# Patient Record
Sex: FEMALE | Race: WHITE | HISPANIC OR LATINO | URBAN - METROPOLITAN AREA
[De-identification: names, ages, dates, MRNs, and addresses within clinical notes are randomized per-mention and may not be internally consistent; named-entity substitution may affect disease eponyms.]

---

## 2022-07-17 ENCOUNTER — HOSPITAL ENCOUNTER (INPATIENT)
Facility: MEDICAL CENTER | Age: 52
LOS: 1 days | DRG: 313 | End: 2022-07-18
Attending: EMERGENCY MEDICINE | Admitting: STUDENT IN AN ORGANIZED HEALTH CARE EDUCATION/TRAINING PROGRAM
Payer: MEDICAID

## 2022-07-17 ENCOUNTER — APPOINTMENT (OUTPATIENT)
Dept: RADIOLOGY | Facility: MEDICAL CENTER | Age: 52
DRG: 313 | End: 2022-07-17
Attending: EMERGENCY MEDICINE
Payer: MEDICAID

## 2022-07-17 DIAGNOSIS — Z79.01 ANTICOAGULATED: ICD-10-CM

## 2022-07-17 DIAGNOSIS — I10 PRIMARY HYPERTENSION: ICD-10-CM

## 2022-07-17 DIAGNOSIS — R07.9 ACUTE CHEST PAIN: ICD-10-CM

## 2022-07-17 PROBLEM — R79.1 SUPRATHERAPEUTIC INR: Status: ACTIVE | Noted: 2022-07-17

## 2022-07-17 PROBLEM — I48.0 PAROXYSMAL ATRIAL FIBRILLATION (HCC): Status: ACTIVE | Noted: 2022-07-17

## 2022-07-17 PROBLEM — R07.89 OTHER CHEST PAIN: Status: ACTIVE | Noted: 2022-07-17

## 2022-07-17 PROBLEM — E78.5 HYPERLIPIDEMIA: Status: ACTIVE | Noted: 2022-07-17

## 2022-07-17 PROBLEM — I24.9 ACS (ACUTE CORONARY SYNDROME) (HCC): Status: ACTIVE | Noted: 2022-07-17

## 2022-07-17 PROBLEM — E87.6 HYPOKALEMIA: Status: ACTIVE | Noted: 2022-07-17

## 2022-07-17 LAB
ALBUMIN SERPL BCP-MCNC: 4.6 G/DL (ref 3.2–4.9)
ALBUMIN/GLOB SERPL: 1.2 G/DL
ALP SERPL-CCNC: 69 U/L (ref 30–99)
ALT SERPL-CCNC: 25 U/L (ref 2–50)
ANION GAP SERPL CALC-SCNC: 12 MMOL/L (ref 7–16)
AST SERPL-CCNC: 20 U/L (ref 12–45)
BASOPHILS # BLD AUTO: 0.6 % (ref 0–1.8)
BASOPHILS # BLD: 0.06 K/UL (ref 0–0.12)
BILIRUB SERPL-MCNC: 0.4 MG/DL (ref 0.1–1.5)
BUN SERPL-MCNC: 25 MG/DL (ref 8–22)
CALCIUM SERPL-MCNC: 9.6 MG/DL (ref 8.5–10.5)
CHLORIDE SERPL-SCNC: 100 MMOL/L (ref 96–112)
CO2 SERPL-SCNC: 29 MMOL/L (ref 20–33)
CREAT SERPL-MCNC: 0.92 MG/DL (ref 0.5–1.4)
EKG IMPRESSION: NORMAL
EKG IMPRESSION: NORMAL
EOSINOPHIL # BLD AUTO: 0.07 K/UL (ref 0–0.51)
EOSINOPHIL NFR BLD: 0.7 % (ref 0–6.9)
ERYTHROCYTE [DISTWIDTH] IN BLOOD BY AUTOMATED COUNT: 46.2 FL (ref 35.9–50)
GFR SERPLBLD CREATININE-BSD FMLA CKD-EPI: 75 ML/MIN/1.73 M 2
GLOBULIN SER CALC-MCNC: 3.7 G/DL (ref 1.9–3.5)
GLUCOSE SERPL-MCNC: 167 MG/DL (ref 65–99)
HCT VFR BLD AUTO: 41 % (ref 37–47)
HGB BLD-MCNC: 12.9 G/DL (ref 12–16)
IMM GRANULOCYTES # BLD AUTO: 0.03 K/UL (ref 0–0.11)
IMM GRANULOCYTES NFR BLD AUTO: 0.3 % (ref 0–0.9)
INR PPP: 4.57 (ref 0.87–1.13)
LYMPHOCYTES # BLD AUTO: 2.54 K/UL (ref 1–4.8)
LYMPHOCYTES NFR BLD: 26.9 % (ref 22–41)
MAGNESIUM SERPL-MCNC: 2.3 MG/DL (ref 1.5–2.5)
MCH RBC QN AUTO: 24.7 PG (ref 27–33)
MCHC RBC AUTO-ENTMCNC: 31.5 G/DL (ref 33.6–35)
MCV RBC AUTO: 78.4 FL (ref 81.4–97.8)
MONOCYTES # BLD AUTO: 0.9 K/UL (ref 0–0.85)
MONOCYTES NFR BLD AUTO: 9.5 % (ref 0–13.4)
NEUTROPHILS # BLD AUTO: 5.85 K/UL (ref 2–7.15)
NEUTROPHILS NFR BLD: 62 % (ref 44–72)
NRBC # BLD AUTO: 0 K/UL
NRBC BLD-RTO: 0 /100 WBC
PLATELET # BLD AUTO: 381 K/UL (ref 164–446)
PMV BLD AUTO: 9.7 FL (ref 9–12.9)
POTASSIUM SERPL-SCNC: 3.3 MMOL/L (ref 3.6–5.5)
PROT SERPL-MCNC: 8.3 G/DL (ref 6–8.2)
PROTHROMBIN TIME: 41.4 SEC (ref 12–14.6)
RBC # BLD AUTO: 5.23 M/UL (ref 4.2–5.4)
SODIUM SERPL-SCNC: 141 MMOL/L (ref 135–145)
TROPONIN T SERPL-MCNC: 7 NG/L (ref 6–19)
WBC # BLD AUTO: 9.5 K/UL (ref 4.8–10.8)

## 2022-07-17 PROCEDURE — 85610 PROTHROMBIN TIME: CPT

## 2022-07-17 PROCEDURE — 71045 X-RAY EXAM CHEST 1 VIEW: CPT

## 2022-07-17 PROCEDURE — A9270 NON-COVERED ITEM OR SERVICE: HCPCS | Performed by: STUDENT IN AN ORGANIZED HEALTH CARE EDUCATION/TRAINING PROGRAM

## 2022-07-17 PROCEDURE — 93005 ELECTROCARDIOGRAM TRACING: CPT | Performed by: EMERGENCY MEDICINE

## 2022-07-17 PROCEDURE — 306637 HCHG MISC ORTHO ITEM RC 0274

## 2022-07-17 PROCEDURE — 93005 ELECTROCARDIOGRAM TRACING: CPT

## 2022-07-17 PROCEDURE — 84484 ASSAY OF TROPONIN QUANT: CPT

## 2022-07-17 PROCEDURE — 83735 ASSAY OF MAGNESIUM: CPT

## 2022-07-17 PROCEDURE — 99223 1ST HOSP IP/OBS HIGH 75: CPT | Mod: GC | Performed by: STUDENT IN AN ORGANIZED HEALTH CARE EDUCATION/TRAINING PROGRAM

## 2022-07-17 PROCEDURE — 36415 COLL VENOUS BLD VENIPUNCTURE: CPT

## 2022-07-17 PROCEDURE — 700102 HCHG RX REV CODE 250 W/ 637 OVERRIDE(OP): Performed by: STUDENT IN AN ORGANIZED HEALTH CARE EDUCATION/TRAINING PROGRAM

## 2022-07-17 PROCEDURE — 85025 COMPLETE CBC W/AUTO DIFF WBC: CPT

## 2022-07-17 PROCEDURE — 99285 EMERGENCY DEPT VISIT HI MDM: CPT

## 2022-07-17 PROCEDURE — 80053 COMPREHEN METABOLIC PANEL: CPT

## 2022-07-17 RX ORDER — POTASSIUM CHLORIDE 20 MEQ/1
40 TABLET, EXTENDED RELEASE ORAL ONCE
Status: COMPLETED | OUTPATIENT
Start: 2022-07-17 | End: 2022-07-17

## 2022-07-17 RX ORDER — ACETAMINOPHEN 325 MG/1
650 TABLET ORAL EVERY 6 HOURS PRN
Status: DISCONTINUED | OUTPATIENT
Start: 2022-07-17 | End: 2022-07-18 | Stop reason: HOSPADM

## 2022-07-17 RX ORDER — FUROSEMIDE 40 MG/1
40 TABLET ORAL
COMMUNITY
Start: 2022-01-21

## 2022-07-17 RX ORDER — POLYETHYLENE GLYCOL 3350 17 G/17G
1 POWDER, FOR SOLUTION ORAL
Status: DISCONTINUED | OUTPATIENT
Start: 2022-07-17 | End: 2022-07-18 | Stop reason: HOSPADM

## 2022-07-17 RX ORDER — ROSUVASTATIN CALCIUM 20 MG/1
20 TABLET, COATED ORAL DAILY
Status: DISCONTINUED | OUTPATIENT
Start: 2022-07-18 | End: 2022-07-18 | Stop reason: HOSPADM

## 2022-07-17 RX ORDER — ENALAPRIL MALEATE 10 MG/1
20 TABLET ORAL 2 TIMES DAILY
Status: DISCONTINUED | OUTPATIENT
Start: 2022-07-17 | End: 2022-07-18 | Stop reason: HOSPADM

## 2022-07-17 RX ORDER — ROSUVASTATIN CALCIUM 20 MG/1
20 TABLET, COATED ORAL DAILY
COMMUNITY

## 2022-07-17 RX ORDER — BISACODYL 10 MG
10 SUPPOSITORY, RECTAL RECTAL
Status: DISCONTINUED | OUTPATIENT
Start: 2022-07-17 | End: 2022-07-18 | Stop reason: HOSPADM

## 2022-07-17 RX ORDER — WARFARIN SODIUM 7.5 MG/1
5-7.5 TABLET ORAL EVERY EVENING
Status: SHIPPED | COMMUNITY
End: 2022-07-18 | Stop reason: SDUPTHER

## 2022-07-17 RX ORDER — DIGOXIN 250 MCG
250 TABLET ORAL DAILY
Status: DISCONTINUED | OUTPATIENT
Start: 2022-07-18 | End: 2022-07-18 | Stop reason: HOSPADM

## 2022-07-17 RX ORDER — DIGOXIN 250 MCG
250 TABLET ORAL DAILY
COMMUNITY

## 2022-07-17 RX ORDER — HYDRALAZINE HYDROCHLORIDE 20 MG/ML
10 INJECTION INTRAMUSCULAR; INTRAVENOUS EVERY 4 HOURS PRN
Status: DISCONTINUED | OUTPATIENT
Start: 2022-07-17 | End: 2022-07-18 | Stop reason: HOSPADM

## 2022-07-17 RX ORDER — CHLORTHALIDONE 25 MG/1
25 TABLET ORAL DAILY
Status: DISCONTINUED | OUTPATIENT
Start: 2022-07-18 | End: 2022-07-18 | Stop reason: HOSPADM

## 2022-07-17 RX ORDER — AMOXICILLIN 250 MG
2 CAPSULE ORAL 2 TIMES DAILY
Status: DISCONTINUED | OUTPATIENT
Start: 2022-07-17 | End: 2022-07-18 | Stop reason: HOSPADM

## 2022-07-17 RX ORDER — ENALAPRIL MALEATE 20 MG/1
1 TABLET ORAL 2 TIMES DAILY
COMMUNITY
Start: 2022-03-22

## 2022-07-17 RX ORDER — POTASSIUM CHLORIDE 20 MEQ/1
20 TABLET, EXTENDED RELEASE ORAL 2 TIMES DAILY
COMMUNITY
Start: 2022-03-03

## 2022-07-17 RX ORDER — CHLORTHALIDONE 25 MG/1
25 TABLET ORAL DAILY
COMMUNITY
Start: 2022-06-02

## 2022-07-17 RX ADMIN — ENALAPRIL MALEATE 20 MG: 10 TABLET ORAL at 23:48

## 2022-07-17 RX ADMIN — POTASSIUM CHLORIDE 40 MEQ: 1500 TABLET, EXTENDED RELEASE ORAL at 23:48

## 2022-07-17 ASSESSMENT — ENCOUNTER SYMPTOMS
WHEEZING: 0
DIZZINESS: 0
NECK PAIN: 0
NAUSEA: 0
DEPRESSION: 0
HEARTBURN: 0
FEVER: 0
ABDOMINAL PAIN: 0
CHILLS: 0
MYALGIAS: 0
HEADACHES: 0
INSOMNIA: 0
COUGH: 0
BLURRED VISION: 0
SHORTNESS OF BREATH: 1
VOMITING: 0
PALPITATIONS: 0
DOUBLE VISION: 0

## 2022-07-17 ASSESSMENT — FIBROSIS 4 INDEX: FIB4 SCORE: 0.54

## 2022-07-18 ENCOUNTER — APPOINTMENT (OUTPATIENT)
Dept: RADIOLOGY | Facility: MEDICAL CENTER | Age: 52
DRG: 313 | End: 2022-07-18
Attending: STUDENT IN AN ORGANIZED HEALTH CARE EDUCATION/TRAINING PROGRAM
Payer: MEDICAID

## 2022-07-18 ENCOUNTER — APPOINTMENT (OUTPATIENT)
Dept: CARDIOLOGY | Facility: MEDICAL CENTER | Age: 52
DRG: 313 | End: 2022-07-18
Attending: STUDENT IN AN ORGANIZED HEALTH CARE EDUCATION/TRAINING PROGRAM
Payer: MEDICAID

## 2022-07-18 VITALS
HEART RATE: 59 BPM | TEMPERATURE: 96.8 F | OXYGEN SATURATION: 96 % | RESPIRATION RATE: 15 BRPM | WEIGHT: 156.53 LBS | DIASTOLIC BLOOD PRESSURE: 67 MMHG | SYSTOLIC BLOOD PRESSURE: 119 MMHG

## 2022-07-18 PROBLEM — Z71.89 ADVANCED CARE PLANNING/COUNSELING DISCUSSION: Status: ACTIVE | Noted: 2022-07-18

## 2022-07-18 LAB
ALBUMIN SERPL BCP-MCNC: 4.2 G/DL (ref 3.2–4.9)
ALBUMIN/GLOB SERPL: 1.3 G/DL
ALP SERPL-CCNC: 61 U/L (ref 30–99)
ALT SERPL-CCNC: 21 U/L (ref 2–50)
ANION GAP SERPL CALC-SCNC: 14 MMOL/L (ref 7–16)
AST SERPL-CCNC: 17 U/L (ref 12–45)
BASOPHILS # BLD AUTO: 0.5 % (ref 0–1.8)
BASOPHILS # BLD: 0.05 K/UL (ref 0–0.12)
BILIRUB SERPL-MCNC: 0.3 MG/DL (ref 0.1–1.5)
BUN SERPL-MCNC: 22 MG/DL (ref 8–22)
CALCIUM SERPL-MCNC: 9 MG/DL (ref 8.5–10.5)
CHLORIDE SERPL-SCNC: 100 MMOL/L (ref 96–112)
CHOLEST SERPL-MCNC: 109 MG/DL (ref 100–199)
CO2 SERPL-SCNC: 25 MMOL/L (ref 20–33)
CREAT SERPL-MCNC: 0.81 MG/DL (ref 0.5–1.4)
D DIMER PPP IA.FEU-MCNC: 0.27 UG/ML (FEU) (ref 0–0.5)
EOSINOPHIL # BLD AUTO: 0.1 K/UL (ref 0–0.51)
EOSINOPHIL NFR BLD: 1.1 % (ref 0–6.9)
ERYTHROCYTE [DISTWIDTH] IN BLOOD BY AUTOMATED COUNT: 45.1 FL (ref 35.9–50)
EST. AVERAGE GLUCOSE BLD GHB EST-MCNC: 143 MG/DL
GFR SERPLBLD CREATININE-BSD FMLA CKD-EPI: 87 ML/MIN/1.73 M 2
GLOBULIN SER CALC-MCNC: 3.3 G/DL (ref 1.9–3.5)
GLUCOSE SERPL-MCNC: 117 MG/DL (ref 65–99)
HBA1C MFR BLD: 6.6 % (ref 4–5.6)
HCT VFR BLD AUTO: 39 % (ref 37–47)
HDLC SERPL-MCNC: 32 MG/DL
HGB BLD-MCNC: 12.6 G/DL (ref 12–16)
IMM GRANULOCYTES # BLD AUTO: 0.03 K/UL (ref 0–0.11)
IMM GRANULOCYTES NFR BLD AUTO: 0.3 % (ref 0–0.9)
INR PPP: 4.41 (ref 0.87–1.13)
LDLC SERPL CALC-MCNC: 52 MG/DL
LIPASE SERPL-CCNC: 28 U/L (ref 11–82)
LV EJECT FRACT  99904: 70
LV EJECT FRACT MOD 2C 99903: 67.8
LV EJECT FRACT MOD 4C 99902: 77.22
LV EJECT FRACT MOD BP 99901: 72
LYMPHOCYTES # BLD AUTO: 2.76 K/UL (ref 1–4.8)
LYMPHOCYTES NFR BLD: 29.4 % (ref 22–41)
MAGNESIUM SERPL-MCNC: 2.1 MG/DL (ref 1.5–2.5)
MCH RBC QN AUTO: 25 PG (ref 27–33)
MCHC RBC AUTO-ENTMCNC: 32.3 G/DL (ref 33.6–35)
MCV RBC AUTO: 77.4 FL (ref 81.4–97.8)
MONOCYTES # BLD AUTO: 0.81 K/UL (ref 0–0.85)
MONOCYTES NFR BLD AUTO: 8.6 % (ref 0–13.4)
NEUTROPHILS # BLD AUTO: 5.65 K/UL (ref 2–7.15)
NEUTROPHILS NFR BLD: 60.1 % (ref 44–72)
NRBC # BLD AUTO: 0 K/UL
NRBC BLD-RTO: 0 /100 WBC
PLATELET # BLD AUTO: 341 K/UL (ref 164–446)
PMV BLD AUTO: 9.8 FL (ref 9–12.9)
POTASSIUM SERPL-SCNC: 3.1 MMOL/L (ref 3.6–5.5)
PROT SERPL-MCNC: 7.5 G/DL (ref 6–8.2)
PROTHROMBIN TIME: 40.3 SEC (ref 12–14.6)
RBC # BLD AUTO: 5.04 M/UL (ref 4.2–5.4)
SODIUM SERPL-SCNC: 139 MMOL/L (ref 135–145)
TRIGL SERPL-MCNC: 124 MG/DL (ref 0–149)
TROPONIN T SERPL-MCNC: 9 NG/L (ref 6–19)
WBC # BLD AUTO: 9.4 K/UL (ref 4.8–10.8)

## 2022-07-18 PROCEDURE — 80061 LIPID PANEL: CPT

## 2022-07-18 PROCEDURE — 84484 ASSAY OF TROPONIN QUANT: CPT

## 2022-07-18 PROCEDURE — 93306 TTE W/DOPPLER COMPLETE: CPT

## 2022-07-18 PROCEDURE — 93306 TTE W/DOPPLER COMPLETE: CPT | Mod: 26 | Performed by: INTERNAL MEDICINE

## 2022-07-18 PROCEDURE — A9502 TC99M TETROFOSMIN: HCPCS

## 2022-07-18 PROCEDURE — 80053 COMPREHEN METABOLIC PANEL: CPT

## 2022-07-18 PROCEDURE — 83036 HEMOGLOBIN GLYCOSYLATED A1C: CPT

## 2022-07-18 PROCEDURE — 83735 ASSAY OF MAGNESIUM: CPT

## 2022-07-18 PROCEDURE — 83690 ASSAY OF LIPASE: CPT

## 2022-07-18 PROCEDURE — 700102 HCHG RX REV CODE 250 W/ 637 OVERRIDE(OP): Performed by: STUDENT IN AN ORGANIZED HEALTH CARE EDUCATION/TRAINING PROGRAM

## 2022-07-18 PROCEDURE — 85025 COMPLETE CBC W/AUTO DIFF WBC: CPT

## 2022-07-18 PROCEDURE — 99239 HOSP IP/OBS DSCHRG MGMT >30: CPT | Performed by: INTERNAL MEDICINE

## 2022-07-18 PROCEDURE — 700111 HCHG RX REV CODE 636 W/ 250 OVERRIDE (IP)

## 2022-07-18 PROCEDURE — 85610 PROTHROMBIN TIME: CPT

## 2022-07-18 PROCEDURE — 36415 COLL VENOUS BLD VENIPUNCTURE: CPT

## 2022-07-18 PROCEDURE — 85379 FIBRIN DEGRADATION QUANT: CPT

## 2022-07-18 PROCEDURE — A9270 NON-COVERED ITEM OR SERVICE: HCPCS | Performed by: STUDENT IN AN ORGANIZED HEALTH CARE EDUCATION/TRAINING PROGRAM

## 2022-07-18 RX ORDER — REGADENOSON 0.08 MG/ML
0.4 INJECTION, SOLUTION INTRAVENOUS ONCE
Status: COMPLETED | OUTPATIENT
Start: 2022-07-18 | End: 2022-07-18

## 2022-07-18 RX ORDER — WARFARIN SODIUM 5 MG/1
5-7.5 TABLET ORAL EVERY EVENING
Status: SHIPPED
Start: 2022-07-18

## 2022-07-18 RX ORDER — REGADENOSON 0.08 MG/ML
INJECTION, SOLUTION INTRAVENOUS
Status: COMPLETED
Start: 2022-07-18 | End: 2022-07-18

## 2022-07-18 RX ADMIN — ENALAPRIL MALEATE 20 MG: 10 TABLET ORAL at 05:22

## 2022-07-18 RX ADMIN — CHLORTHALIDONE 25 MG: 25 TABLET ORAL at 05:22

## 2022-07-18 RX ADMIN — ROSUVASTATIN CALCIUM 20 MG: 20 TABLET, FILM COATED ORAL at 05:20

## 2022-07-18 RX ADMIN — REGADENOSON 0.4 MG: 0.08 INJECTION, SOLUTION INTRAVENOUS at 08:41

## 2022-07-18 RX ADMIN — DIGOXIN 250 MCG: 250 TABLET ORAL at 05:22

## 2022-07-18 RX ADMIN — LIDOCAINE HYDROCHLORIDE 15 ML: 20 SOLUTION OROPHARYNGEAL at 00:57

## 2022-07-18 NOTE — ED PROVIDER NOTES
ED Provider Note    CHIEF COMPLAINT  Chief Complaint   Patient presents with   • Chest Pain     Reports that pain is going from her back through to her chest.  Pt describes pain as dull 7/10.        HPI  Jasmine Bob is a 51 y.o. female who presents complaining of chest pain.  She has had this off and on for a few days.  Is a pressure in the substernal area.  Seems to come and go.  Sometimes associate with exertion but not others.  She denies any pleuritic symptoms.  There is no leg pain or swelling.  No recent trips or travel.  She is never had this before.  She does have a history of a mitral valve repair secondary rheumatic heart disease.  She is on Coumadin.  Socially she is in atrial fibrillation chronically.  There is no other complaint.  It sounds like she had an echo a few years ago, but has not had any risk stratification.    PAST MEDICAL HISTORY  Past Medical History:   Diagnosis Date   • Atrial fibrillation (HCC)    • Blood clot due to device, implant, or graft     mitral valve   • CVA (cerebral vascular accident) (HCC)    • DVT (deep venous thrombosis) (HCC)    • H/O mitral valve replacement        FAMILY HISTORY  History reviewed. No pertinent family history.    SOCIAL HISTORY  Social History     Tobacco Use   • Smoking status: Never Smoker   • Smokeless tobacco: Never Used   Vaping Use   • Vaping Use: Never used   Substance Use Topics   • Alcohol use: Not Currently   • Drug use: Not Currently     She is here with her daughters    SURGICAL HISTORY  Past Surgical History:   Procedure Laterality Date   • MITRAL VALVE REPLACEMENT     • PRIMARY C SECTION         CURRENT MEDICATIONS  Home Medications     Reviewed by Komal San (Pharmacy Tech) on 07/17/22 at 2142  Med List Status: Complete   Medication Last Dose Status   chlorthalidone (HYGROTON) 25 MG Tab 7/16/2022 Active   digoxin (LANOXIN) 250 MCG Tab 7/17/2022 Active   enalapril (VASOTEC) 20 MG tablet 7/17/2022 Active   furosemide  (LASIX) 40 MG Tab 7/17/2022 Active   potassium chloride SA (KDUR) 20 MEQ Tab CR 7/17/2022 Active   rosuvastatin (CRESTOR) 20 MG Tab 7/17/2022 Active   warfarin (COUMADIN) 7.5 MG Tab 7/16/2022 Active                I have reviewed the nurses notes and/or the list brought with the patient.    ALLERGIES  No Known Allergies    REVIEW OF SYSTEMS  See HPI for further details. Review of systems as above, otherwise all other systems are negative.     PHYSICAL EXAM  VITAL SIGNS: /74   Pulse (!) 57   Temp 36 °C (96.8 °F) (Temporal)   Resp 20   Wt 71 kg (156 lb 8.4 oz)   SpO2 95%     Constitutional: Well appearing patient in no acute distress.  Not toxic, nor ill in appearance.  HENT: Mucus membranes moist.  Oropharynx is clear.  Eyes: Pupils equally round.  No scleral icterus.   Neck: Full nontender range of motion.  Lymphatic: No cervical lymphadenopathy noted.   Cardiovascular: Irregular, atrial fibrillation on the monitor.  Slight systolic murmur.   Thorax & Lungs: Chest is nontender.  Lungs are clear to auscultation with good air movement bilaterally.  No wheeze, rhonchi, nor rales.   Abdomen: Soft, with no tenderness, rebound nor guarding.  No mass, pulsatile mass, nor hepatosplenomegaly appreciated.  Skin: No purpura nor petechia noted.  Extremities/Musculoskeletal: No sign of trauma.  Calves are nontender with no cords nor edema.  No Victorino's sign.  Pulses are intact all around.   Neurologic: Alert & oriented.  Strength and sensation is intact all around.  Psychiatric: Normal affect appropriate for the clinical situation.    EKG  I interpreted this EKG myself.  This is a 12-lead study.  The rhythm is atrial fibrillation with a rate of 65.  There are no ST segment nor T wave abnormalities.  Interpretation: No ST segment elevation myocardial infarction.    LABS  Labs Reviewed   CBC WITH DIFFERENTIAL - Abnormal; Notable for the following components:       Result Value    MCV 78.4 (*)     MCH 24.7 (*)     MCHC  "31.5 (*)     Monos (Absolute) 0.90 (*)     All other components within normal limits   COMP METABOLIC PANEL - Abnormal; Notable for the following components:    Potassium 3.3 (*)     Glucose 167 (*)     Bun 25 (*)     Total Protein 8.3 (*)     Globulin 3.7 (*)     All other components within normal limits   PROTHROMBIN TIME - Abnormal; Notable for the following components:    PT 41.4 (*)     INR 4.57 (*)     All other components within normal limits    Narrative:     Indicate which anticoagulants the patient is on:->COUMADIN   TROPONIN   ESTIMATED GFR   MAGNESIUM   TROPONIN   CBC WITH DIFFERENTIAL   COMP METABOLIC PANEL   LIPID PROFILE   HEMOGLOBIN A1C   MAGNESIUM         RADIOLOGY/PROCEDURES  I have reviewed the patient's film interpretations myself, and they are read out by the radiologist as:   DX-CHEST-PORTABLE (1 VIEW)   Final Result      1.  There is an enlarged cardiac silhouette with no cardiopulmonary process.      EC-ECHOCARDIOGRAM COMPLETE W/O CONT    (Results Pending)   NM-CARDIAC STRESS TEST    (Results Pending)     .    MEDICAL RECORD  I have reviewed patient's medical record and pertinent results are listed above.    COURSE & MEDICAL DECISION MAKING  I have reviewed any medical record information, laboratory studies and radiographic results as noted above.  This patient presents with some precordial chest pain.  Her for troponin is negative, her EKG is negative for ischemic changes.  She has multiple cardiovascular risk factors to include \"borderline\" diabetes as well as hypertension.  I calculate her heart score to be in the moderate risk category.  For this reason we will go ahead and put her in the hospital for further work-up.  Her chest x-ray is clear shows no evidence of acute process.  Do not suspect pulmonary embolism; she is anticoagulated as noted.  Case discussed with the hospitalist, who has admitted her.    FINAL IMPRESSION  1. Acute chest pain    2. Anticoagulated    3. Primary " hypertension           This dictation was created using voice recognition software.    Electronically signed by: Bentley Marquez M.D., 7/17/2022 11:58 PM

## 2022-07-18 NOTE — ASSESSMENT & PLAN NOTE
Patient with history of paroxysmal A. fib, currently on digoxin and warfarin.  - Continue home regimen (warfarin held due to supratherapeutic levels).

## 2022-07-18 NOTE — ED NOTES
Pt ambulatory back to bed w/o assistance. Connected to VS, resting comfortably NAD, equal chest rise/fall. Denies any needs at this time.

## 2022-07-18 NOTE — ASSESSMENT & PLAN NOTE
Patient with history of hyperlipidemia, currently on rosuvastatin 20 mg daily.  - Continue home regimen  - Lipid profile ordered for a.m., will follow

## 2022-07-18 NOTE — ED TRIAGE NOTES
Chief Complaint   Patient presents with   • Chest Pain     Reports that pain is going from her back through to her chest.  Pt describes pain as dull 7/10.      Pt with significant cardiac hx. Hx mitral valve replacement, CVA, multiple blood clots.   AORTA score 1.    Protocol initiated.   BP (!) 164/83   Pulse (!) 59   Temp 36 °C (96.8 °F) (Temporal)   Resp 17   Wt 71 kg (156 lb 8.4 oz)   SpO2 98%   Pt informed of wait times. Educated on triage process.  Asked to return to triage RN for any new or worsening of symptoms. Thanked for patience.        Charge RN notified of pt.

## 2022-07-18 NOTE — ASSESSMENT & PLAN NOTE
Patient noted to have potassium 3.3 on admission, suspect likely secondary to diuretic use.  - We will continue monitor, will replete as needed

## 2022-07-18 NOTE — ED NOTES
Discharge instructions discussed with pt. Pt verbalized understanding. Pt discharged ambulatory with daughter.

## 2022-07-18 NOTE — ED NOTES
Pt medicated per MAR, blood collected and sent to lab  Resting in bed, NAD  Daughter and pt provided pillows and warm blankets, denies any other needs at this time

## 2022-07-18 NOTE — ASSESSMENT & PLAN NOTE
Patient with heart score of 5, moderate risk.  At this time, troponin is normal, however EKG is showing evidence of ischemia given T wave inversion.  - Patient admitted to telemetry in the setting of ACS work-up  - We will repeat troponin in 4 hours, and EKG if pt's CP presents again  - Stress test ordered in am, NPO at MN

## 2022-07-18 NOTE — ASSESSMENT & PLAN NOTE
Patient noted to have INR greater than 4 on admission.  Patient currently on Coumadin for mechanical mitral valve replacement as well as A. fib.  Goal INR between 2.5 and 3.5.  - Coumadin currently held, will reinitiate when appropriate

## 2022-07-18 NOTE — H&P
[facility] Internal Medicine History & Physical Note    Date of Service  7/17/2022    [facility] Team: [email or web address]   Attending: Dr. Waqas Taylor   Senior Resident: Dr. Rodas  Contact Number: [phone]    Primary Care Physician  Pcp Pt States None    Consultants  None    Specialist Names: N/A    Code Status  FULL CODE    Chief Complaint  Chief Complaint   Patient presents with   • Chest Pain     Reports that pain is going from her back through to her chest.  Pt describes pain as dull 7/10.        History of Presenting Illness (HPI):      was offered to patient, however patient declined and requested utilization of her children for translation.  Upon discussion with patient and family, the following history was obtained and appeared to be adequately understood amongst all parties.    Jasmine Bob is a 51 y.o. female who presented 7/17/2022 with acute onset chest pain on 7/16/2022.  Patient states she was resting at home and all of a sudden she experienced a pressure type chest pain with radiation to her back.  She has never experienced pain like this before, and states that it occurred for a few minutes and then resolved on its own.  It did recur intermittently with movement throughout the day as well.  She reports mild shortness of breath, but denies any cough, wheezing, nausea, vomiting, leg swelling.  Patient has history of mechanical mitral valve replacement in the 90s as well as atrial fibrillation, hypertension, hyperlipidemia.  She states she is taking her medicine routinely, and does not miss any doses.  Patient has not taken any over-the-counter medications or other alleviating medications.    In the ED, patient was noted to have troponin of 7, chest x-ray unremarkable for acute process, EKG revealing T wave inversions.    I discussed the plan of care with patient, family and my attending.    Review of Systems  Review of Systems   Constitutional: Negative for chills and fever.   Eyes:  Negative for blurred vision and double vision.   Respiratory: Positive for shortness of breath. Negative for cough and wheezing.    Cardiovascular: Positive for chest pain. Negative for palpitations and leg swelling.   Gastrointestinal: Negative for abdominal pain, heartburn, nausea and vomiting.   Genitourinary: Negative for dysuria and urgency.   Musculoskeletal: Negative for myalgias and neck pain.   Skin: Negative for itching and rash.   Neurological: Negative for dizziness and headaches.   Psychiatric/Behavioral: Negative for depression. The patient does not have insomnia.        Past Medical History   has a past medical history of Atrial fibrillation (HCC), Blood clot due to device, implant, or graft, CVA (cerebral vascular accident) (HCC), DVT (deep venous thrombosis) (HCC), and H/O mitral valve replacement.    Surgical History   has a past surgical history that includes mitral valve replacement and primary c section.     Family History  family history is not on file.   Family history reviewed with patient.     Social History  Tobacco: Denies  Alcohol: Denies   Recreational drugs (illegal or prescription): Denies  Employment: Denies  Living Situation: Lives in [place] with daughter   Recent Travel: Denies  Primary Care Provider: Reviewed  Other (stressors, spirituality, exposures): Denies    Allergies  No Known Allergies    Medications  Prior to Admission Medications   Prescriptions Last Dose Informant Patient Reported? Taking?   digoxin (LANOXIN) 250 MCG Tab   Yes Yes   Sig: Take 250 mcg by mouth every day.   warfarin (COUMADIN) 7.5 MG Tab   Yes Yes   Sig: Take 7.5 mg by mouth every day. 7.5 mg Monday and Thursday  5mg the rest of the week      Facility-Administered Medications: None       Physical Exam  Temp:  [36 °C (96.8 °F)] 36 °C (96.8 °F)  Pulse:  [59] 59  Resp:  [17] 17  BP: (164)/(83) 164/83  SpO2:  [98 %] 98 %  Blood Pressure: (!) 164/83   Temperature: 36 °C (96.8 °F)   Pulse: (!) 59   Respiration:  17   Pulse Oximetry: 98 %       Physical Exam  Constitutional:       Appearance: Normal appearance.   HENT:      Head: Normocephalic and atraumatic.      Mouth/Throat:      Mouth: Mucous membranes are moist.      Pharynx: Oropharynx is clear.   Eyes:      Pupils: Pupils are equal, round, and reactive to light.   Cardiovascular:      Rate and Rhythm: Normal rate and regular rhythm.      Pulses: Normal pulses.      Heart sounds: Normal heart sounds.   Pulmonary:      Effort: Pulmonary effort is normal.      Breath sounds: Normal breath sounds.   Abdominal:      General: Abdomen is flat.   Musculoskeletal:         General: Normal range of motion.   Skin:     General: Skin is warm and dry.   Neurological:      General: No focal deficit present.      Mental Status: She is alert and oriented to person, place, and time.   Psychiatric:         Mood and Affect: Mood normal.         Behavior: Behavior normal.         Laboratory:  Recent Labs     07/17/22 1906   WBC 9.5   RBC 5.23   HEMOGLOBIN 12.9   HEMATOCRIT 41.0   MCV 78.4*   MCH 24.7*   MCHC 31.5*   RDW 46.2   PLATELETCT 381   MPV 9.7     Recent Labs     07/17/22  1906   SODIUM 141   POTASSIUM 3.3*   CHLORIDE 100   CO2 29   GLUCOSE 167*   BUN 25*   CREATININE 0.92   CALCIUM 9.6     Recent Labs     07/17/22  1906   ALTSGPT 25   ASTSGOT 20   ALKPHOSPHAT 69   TBILIRUBIN 0.4   GLUCOSE 167*         No results for input(s): NTPROBNP in the last 72 hours.      Recent Labs     07/17/22  1906   TROPONINT 7       Imaging:  DX-CHEST-PORTABLE (1 VIEW)   Final Result      1.  There is an enlarged cardiac silhouette with no cardiopulmonary process.            Assessment/Plan:  Problem Representation:   Jasmine Bob is a 51 y.o. female who presented 7/17/2022 with acute onset chest pain on 7/16/2022.  Patient has history of hypertension, hyperlipidemia, troponin normal, however EKG revealing T wave inversions, with overall moderate heart score.    I anticipate this  patient is appropriate for observation status at this time because She is to undergo stress test in a.m. for disposition    * ACS Rule Out- (present on admission)  Assessment & Plan  Patient with heart score of 5, moderate risk.  At this time, troponin is normal, however EKG is showing evidence of ischemia given T wave inversion.  - Patient admitted to telemetry in the setting of ACS work-up  - We will repeat troponin in 4 hours, and EKG if pt's CP presents again  - Stress test ordered in am, NPO at MN    Hypertension  Assessment & Plan  Patient with history of hypertension, currently normotensive.  - Continue home regimen    Paroxysmal atrial fibrillation (HCC)  Assessment & Plan  Patient with history of paroxysmal A. fib, currently on digoxin and warfarin.  - Continue home regimen (warfarin held due to supratherapeutic levels).    Hyperlipidemia  Assessment & Plan  Patient with history of hyperlipidemia, currently on rosuvastatin 20 mg daily.  - Continue home regimen  - Lipid profile ordered for a.m., will follow    Hypokalemia  Assessment & Plan  Patient noted to have potassium 3.3 on admission, suspect likely secondary to diuretic use.  - We will continue monitor, will replete as needed    Supratherapeutic INR  Assessment & Plan  Patient noted to have INR greater than 4 on admission.  Patient currently on Coumadin for mechanical mitral valve replacement as well as A. fib.  Goal INR between 2.5 and 3.5.  - Coumadin currently held, will reinitiate when appropriate    Advanced care planning/counseling discussion  Assessment & Plan  Initially patient declares self DNR/DNI, however after further discussion with patient and family about implications of DNR/DNI and potential outcomes of code, patient and family elected for full CODE STATUS.    CODE: FULL  VTE prophylaxis: therapeutic anticoagulation with coumadin  Antibiotics: None  GI prophylaxis: None  Diet: Cardiac, n.p.o. at midnight  Disposition: Inpatient,  pending stress test

## 2022-07-18 NOTE — ASSESSMENT & PLAN NOTE
Initially patient declares self DNR/DNI, however after further discussion with patient and family about implications of DNR/DNI and potential outcomes of code, patient and family elected for full CODE STATUS.

## 2022-07-18 NOTE — PROGRESS NOTES
Inpatient Anticoagulation Service Note    Date: 7/17/2022    Reason for Anticoagulation: Mechanical Mitral Valve Replacement  , Deep Vein Thrombosis, Stroke   Target INR: 2.5 to 3.5         Hemoglobin Value: 12.9  Hematocrit Value: 41  Lab Platelet Value: 381    INR from last 7 days     Date/Time INR Value    07/17/22 2110 4.57        Dose from last 7 days     Date/Time Dose (mg)    07/17/22 2254 0        Average Dose (mg): 5.7  Significant Interactions: Statin  Bridge Therapy: No (If less than 5 days and overlap therapy discontinued -- document reason (i.e. Bleed Risk))    (If still on overlap therapy, if No -- document reason (i.e. Bleed Risk))    Reversal Agent Administered: Not Applicable    Comments: Pt has history of CVA, clots, and mechanical valve replacement. She has been taking warfarin 7.5mg Mon/Thurs and 5mg AOD at home prior to admit. Pt presents today with chest pain; pt is hypertensive with other vitals stable. H/H is stable, INR is 4.57. No evidence of bleed. Pt last took warfarin dose 7/16, give that INR is supratherapeutic will hold tonight's dose. INR lab ordered for tomorrow at 0600. Floor pharmacist to follow.    Plan:  Hold warfarin tonight, floor pharmacist to follow tomorrow after repeat INR.    Education Material Provided?: No    Pharmacist suggested discharge dosing: Resume home dosing and follow up for repeat INR within 48 hours of discharge.     Anna Ortiz, PharmD

## 2022-07-18 NOTE — ED NOTES
Med rec updated and complete. Allergies reviewed. Confirmed name and date of birth.  No antibiotic use in last 30 days.      Home pharmacy [facility] [phone]

## 2022-07-19 NOTE — DISCHARGE SUMMARY
Discharge Summary    CHIEF COMPLAINT ON ADMISSION  Chief Complaint   Patient presents with   • Chest Pain     Reports that pain is going from her back through to her chest.  Pt describes pain as dull 7/10.        Reason for Admission  Chest Pain     Admission Date  7/17/2022    CODE STATUS  Full    HPI & HOSPITAL COURSE  This is a 51 y.o. female here with afib, h/o DVT, CVA, mitral valve replacement who presented with chest pain. Troponin was normal x2 and EKG was negative for STEMI. Lipase was normal and Ddimer was low. CXR showed enlarged cardiac silhouette, otherwise unremarkable. TTE was unremarkable with normal functioning mitral valve prothesis. Cardiac stress test showed no reversible defects, it did show inferolateral defect of likely old infarct. She had a GI cocktail that made no effect on her pain. She did have some chest pain with movement of her upper body, possible MSK pain.  Of note, her INR was 4.57 and then 4.41. Her warfarin dosing was discussed with pharmacy. Her warfarin was held and restarted with 2.5mg on 7/18/22, then resume her normal schedule with close followup with her cardiology for INR monitoring in [place].    Therefore, she is discharged in good and stable condition to home with close outpatient follow-up.    The patient recovered much more quickly than anticipated on admission.    Discharge Date  7/18/2022    FOLLOW UP ITEMS POST DISCHARGE  Follow up with cardiology regarding warfarin management and stress test results  Follow up with PCP regarding A1c of 6.6%    DISCHARGE DIAGNOSES  Principal Problem:    ACS Rule Out POA: Yes  Active Problems:    Supratherapeutic INR POA: Unknown    Hypokalemia POA: Unknown    Hyperlipidemia POA: Unknown      Overview: Patient with history of hyperlipidemia, currently on rosuvastatin 20 mg       daily.      - Continue patient's current regimen      - Lipid profile ordered for a.m., with follow-up    Paroxysmal atrial fibrillation (HCC) POA:  Unknown    Hypertension POA: Unknown    Advanced care planning/counseling discussion POA: Unknown  Resolved Problems:    * No resolved hospital problems. *      FOLLOW UP  No follow-up provider specified.    MEDICATIONS ON DISCHARGE     Medication List      CHANGE how you take these medications      Instructions   warfarin 5 MG Tabs  What changed:   · medication strength  · additional instructions  Commonly known as: COUMADIN   Take 1-1.5 Tablets by mouth every evening. (Take 2.5mg tonight 7/18/22, then resume your normal schedule. Get your INR checked this week)   7.5 mg Monday and Thursday  5mg the rest of the week  Dose: 5-7.5 mg        CONTINUE taking these medications      Instructions   chlorthalidone 25 MG Tabs  Commonly known as: HYGROTON   Take 25 mg by mouth every day.  Dose: 25 mg     digoxin 250 MCG Tabs  Commonly known as: LANOXIN   Take 250 mcg by mouth every day.  Dose: 250 mcg     enalapril 20 MG tablet  Commonly known as: VASOTEC   Take 1 Tablet by mouth 2 times a day.  Dose: 1 Tablet     furosemide 40 MG Tabs  Commonly known as: LASIX   Take 40 mg by mouth every 48 hours.  Dose: 40 mg     potassium chloride SA 20 MEQ Tbcr  Commonly known as: Kdur   Take 20 mEq by mouth 2 times a day.  Dose: 20 mEq     rosuvastatin 20 MG Tabs  Commonly known as: CRESTOR   Take 20 mg by mouth every day.  Dose: 20 mg            Allergies  No Known Allergies    DIET  No orders of the defined types were placed in this encounter.      ACTIVITY  As tolerated.  Weight bearing as tolerated    CONSULTATIONS  None    PROCEDURES  EC-ECHOCARDIOGRAM COMPLETE W/O CONT   Final Result      NM-CARDIAC STRESS TEST   Final Result      DX-CHEST-PORTABLE (1 VIEW)   Final Result      1.  There is an enlarged cardiac silhouette with no cardiopulmonary process.            LABORATORY  Lab Results   Component Value Date    SODIUM 139 07/18/2022    POTASSIUM 3.1 (L) 07/18/2022    CHLORIDE 100 07/18/2022    CO2 25 07/18/2022    GLUCOSE 117 (H)  07/18/2022    BUN 22 07/18/2022    CREATININE 0.81 07/18/2022        Lab Results   Component Value Date    WBC 9.4 07/18/2022    HEMOGLOBIN 12.6 07/18/2022    HEMATOCRIT 39.0 07/18/2022    PLATELETCT 341 07/18/2022        Total time of the discharge process exceeds 30 minutes.

## 2023-11-10 NOTE — ASSESSMENT & PLAN NOTE
Patient with history of hypertension, currently normotensive.  - Continue home regimen   Spoke to patient  Patient is aware of below and nothing further needed